# Patient Record
Sex: MALE | Race: WHITE | Employment: FULL TIME | URBAN - NONMETROPOLITAN AREA
[De-identification: names, ages, dates, MRNs, and addresses within clinical notes are randomized per-mention and may not be internally consistent; named-entity substitution may affect disease eponyms.]

---

## 2018-04-30 ENCOUNTER — TELEPHONE (OUTPATIENT)
Dept: CARDIOLOGY CLINIC | Age: 53
End: 2018-04-30

## 2018-05-09 RX ORDER — LANSOPRAZOLE 30 MG/1
30 CAPSULE, DELAYED RELEASE ORAL DAILY
COMMUNITY

## 2018-05-09 RX ORDER — HYDROCHLOROTHIAZIDE 25 MG/1
25 TABLET ORAL DAILY
COMMUNITY

## 2018-05-09 RX ORDER — LOSARTAN POTASSIUM 100 MG/1
100 TABLET ORAL DAILY
COMMUNITY

## 2018-05-11 ENCOUNTER — PREP FOR PROCEDURE (OUTPATIENT)
Dept: CARDIOLOGY | Age: 53
End: 2018-05-11

## 2018-05-11 RX ORDER — SODIUM CHLORIDE 0.9 % (FLUSH) 0.9 %
10 SYRINGE (ML) INJECTION PRN
Status: CANCELLED | OUTPATIENT
Start: 2018-05-11

## 2018-05-11 RX ORDER — DIPHENHYDRAMINE HYDROCHLORIDE 50 MG/ML
50 INJECTION INTRAMUSCULAR; INTRAVENOUS ONCE
Status: CANCELLED | OUTPATIENT
Start: 2018-05-11 | End: 2018-05-11

## 2018-05-11 RX ORDER — NITROGLYCERIN 0.4 MG/1
0.4 TABLET SUBLINGUAL EVERY 5 MIN PRN
Status: CANCELLED | OUTPATIENT
Start: 2018-05-11

## 2018-05-11 RX ORDER — SODIUM CHLORIDE 9 MG/ML
INJECTION, SOLUTION INTRAVENOUS CONTINUOUS
Status: CANCELLED | OUTPATIENT
Start: 2018-05-11

## 2018-05-11 RX ORDER — ASPIRIN 325 MG
325 TABLET ORAL ONCE
Status: CANCELLED | OUTPATIENT
Start: 2018-05-11 | End: 2018-05-11

## 2018-05-11 RX ORDER — SODIUM CHLORIDE 0.9 % (FLUSH) 0.9 %
10 SYRINGE (ML) INJECTION EVERY 12 HOURS SCHEDULED
Status: CANCELLED | OUTPATIENT
Start: 2018-05-11

## 2018-05-14 ENCOUNTER — APPOINTMENT (OUTPATIENT)
Dept: CARDIAC CATH/INVASIVE PROCEDURES | Age: 53
End: 2018-05-14
Attending: INTERNAL MEDICINE
Payer: COMMERCIAL

## 2018-05-14 ENCOUNTER — HOSPITAL ENCOUNTER (OUTPATIENT)
Dept: INPATIENT UNIT | Age: 53
Discharge: HOME | End: 2018-05-14
Attending: INTERNAL MEDICINE | Admitting: INTERNAL MEDICINE
Payer: COMMERCIAL

## 2018-05-14 VITALS
SYSTOLIC BLOOD PRESSURE: 120 MMHG | TEMPERATURE: 98.2 F | HEART RATE: 71 BPM | RESPIRATION RATE: 16 BRPM | DIASTOLIC BLOOD PRESSURE: 60 MMHG | HEIGHT: 72 IN | WEIGHT: 228 LBS | OXYGEN SATURATION: 99 % | BODY MASS INDEX: 30.88 KG/M2

## 2018-05-14 PROBLEM — Z98.890 S/P CARDIAC CATH: Status: ACTIVE | Noted: 2018-05-14

## 2018-05-14 LAB
ABO: NORMAL
ANION GAP SERPL CALCULATED.3IONS-SCNC: 15 MEQ/L (ref 8–16)
ANTIBODY SCREEN: NORMAL
APTT: 30.9 SECONDS (ref 22–38)
BUN BLDV-MCNC: 17 MG/DL (ref 7–22)
CALCIUM SERPL-MCNC: 9.3 MG/DL (ref 8.5–10.5)
CHLORIDE BLD-SCNC: 99 MEQ/L (ref 98–111)
CO2: 28 MEQ/L (ref 23–33)
CREAT SERPL-MCNC: 0.7 MG/DL (ref 0.4–1.2)
GFR SERPL CREATININE-BSD FRML MDRD: > 90 ML/MIN/1.73M2
GLUCOSE BLD-MCNC: 132 MG/DL (ref 70–108)
HCT VFR BLD CALC: 46.9 % (ref 42–52)
HEMOGLOBIN: 16.3 GM/DL (ref 14–18)
INR BLD: 0.92 (ref 0.85–1.13)
MCH RBC QN AUTO: 32 PG (ref 27–31)
MCHC RBC AUTO-ENTMCNC: 34.8 GM/DL (ref 33–37)
MCV RBC AUTO: 92 FL (ref 80–94)
PDW BLD-RTO: 12.7 % (ref 11.5–14.5)
PLATELET # BLD: 276 THOU/MM3 (ref 130–400)
PMV BLD AUTO: 8.5 FL (ref 7.4–10.4)
POTASSIUM REFLEX MAGNESIUM: 4.2 MEQ/L (ref 3.5–5.2)
RBC # BLD: 5.1 MILL/MM3 (ref 4.7–6.1)
RH FACTOR: NORMAL
SODIUM BLD-SCNC: 142 MEQ/L (ref 135–145)
WBC # BLD: 9.9 THOU/MM3 (ref 4.8–10.8)

## 2018-05-14 PROCEDURE — 85610 PROTHROMBIN TIME: CPT

## 2018-05-14 PROCEDURE — C1894 INTRO/SHEATH, NON-LASER: HCPCS

## 2018-05-14 PROCEDURE — 2780000010 HC IMPLANT OTHER

## 2018-05-14 PROCEDURE — 80048 BASIC METABOLIC PNL TOTAL CA: CPT

## 2018-05-14 PROCEDURE — 86901 BLOOD TYPING SEROLOGIC RH(D): CPT

## 2018-05-14 PROCEDURE — 93458 L HRT ARTERY/VENTRICLE ANGIO: CPT | Performed by: INTERNAL MEDICINE

## 2018-05-14 PROCEDURE — 85027 COMPLETE CBC AUTOMATED: CPT

## 2018-05-14 PROCEDURE — 99203 OFFICE O/P NEW LOW 30 MIN: CPT | Performed by: INTERNAL MEDICINE

## 2018-05-14 PROCEDURE — 2500000003 HC RX 250 WO HCPCS

## 2018-05-14 PROCEDURE — C1887 CATHETER, GUIDING: HCPCS

## 2018-05-14 PROCEDURE — 2580000003 HC RX 258: Performed by: NURSE PRACTITIONER

## 2018-05-14 PROCEDURE — 86900 BLOOD TYPING SEROLOGIC ABO: CPT

## 2018-05-14 PROCEDURE — 93571 IV DOP VEL&/PRESS C FLO 1ST: CPT | Performed by: INTERNAL MEDICINE

## 2018-05-14 PROCEDURE — C1769 GUIDE WIRE: HCPCS

## 2018-05-14 PROCEDURE — 93005 ELECTROCARDIOGRAM TRACING: CPT | Performed by: NURSE PRACTITIONER

## 2018-05-14 PROCEDURE — 86850 RBC ANTIBODY SCREEN: CPT

## 2018-05-14 PROCEDURE — 36415 COLL VENOUS BLD VENIPUNCTURE: CPT

## 2018-05-14 PROCEDURE — 6360000002 HC RX W HCPCS

## 2018-05-14 PROCEDURE — 85730 THROMBOPLASTIN TIME PARTIAL: CPT

## 2018-05-14 RX ORDER — ASPIRIN 325 MG
325 TABLET ORAL ONCE
Status: DISCONTINUED | OUTPATIENT
Start: 2018-05-14 | End: 2018-05-15 | Stop reason: HOSPADM

## 2018-05-14 RX ORDER — ATORVASTATIN CALCIUM 40 MG/1
40 TABLET, FILM COATED ORAL DAILY
COMMUNITY

## 2018-05-14 RX ORDER — SODIUM CHLORIDE 0.9 % (FLUSH) 0.9 %
10 SYRINGE (ML) INJECTION PRN
Status: DISCONTINUED | OUTPATIENT
Start: 2018-05-14 | End: 2018-05-15 | Stop reason: HOSPADM

## 2018-05-14 RX ORDER — SODIUM CHLORIDE 0.9 % (FLUSH) 0.9 %
10 SYRINGE (ML) INJECTION PRN
Status: DISCONTINUED | OUTPATIENT
Start: 2018-05-14 | End: 2018-05-14 | Stop reason: SDUPTHER

## 2018-05-14 RX ORDER — SODIUM CHLORIDE 0.9 % (FLUSH) 0.9 %
10 SYRINGE (ML) INJECTION EVERY 12 HOURS SCHEDULED
Status: DISCONTINUED | OUTPATIENT
Start: 2018-05-14 | End: 2018-05-14 | Stop reason: SDUPTHER

## 2018-05-14 RX ORDER — ONDANSETRON 2 MG/ML
4 INJECTION INTRAMUSCULAR; INTRAVENOUS EVERY 6 HOURS PRN
Status: DISCONTINUED | OUTPATIENT
Start: 2018-05-14 | End: 2018-05-15 | Stop reason: HOSPADM

## 2018-05-14 RX ORDER — NITROGLYCERIN 0.4 MG/1
TABLET SUBLINGUAL
Qty: 25 TABLET | Refills: 3 | Status: SHIPPED | OUTPATIENT
Start: 2018-05-14

## 2018-05-14 RX ORDER — ACETAMINOPHEN 325 MG/1
650 TABLET ORAL EVERY 4 HOURS PRN
Status: DISCONTINUED | OUTPATIENT
Start: 2018-05-14 | End: 2018-05-15 | Stop reason: HOSPADM

## 2018-05-14 RX ORDER — SODIUM CHLORIDE 9 MG/ML
INJECTION, SOLUTION INTRAVENOUS CONTINUOUS
Status: ACTIVE | OUTPATIENT
Start: 2018-05-14 | End: 2018-05-14

## 2018-05-14 RX ORDER — SODIUM CHLORIDE 9 MG/ML
INJECTION, SOLUTION INTRAVENOUS CONTINUOUS
Status: DISCONTINUED | OUTPATIENT
Start: 2018-05-14 | End: 2018-05-14 | Stop reason: SDUPTHER

## 2018-05-14 RX ORDER — NITROGLYCERIN 0.4 MG/1
0.4 TABLET SUBLINGUAL EVERY 5 MIN PRN
Status: DISCONTINUED | OUTPATIENT
Start: 2018-05-14 | End: 2018-05-15 | Stop reason: HOSPADM

## 2018-05-14 RX ORDER — DIPHENHYDRAMINE HYDROCHLORIDE 50 MG/ML
50 INJECTION INTRAMUSCULAR; INTRAVENOUS ONCE
Status: DISCONTINUED | OUTPATIENT
Start: 2018-05-14 | End: 2018-05-15 | Stop reason: HOSPADM

## 2018-05-14 RX ORDER — SODIUM CHLORIDE 0.9 % (FLUSH) 0.9 %
10 SYRINGE (ML) INJECTION EVERY 12 HOURS SCHEDULED
Status: DISCONTINUED | OUTPATIENT
Start: 2018-05-14 | End: 2018-05-15 | Stop reason: HOSPADM

## 2018-05-14 RX ADMIN — SODIUM CHLORIDE: 9 INJECTION, SOLUTION INTRAVENOUS at 12:17

## 2018-05-15 LAB
EKG ATRIAL RATE: 63 BPM
EKG P AXIS: 49 DEGREES
EKG P-R INTERVAL: 162 MS
EKG Q-T INTERVAL: 414 MS
EKG QRS DURATION: 92 MS
EKG QTC CALCULATION (BAZETT): 423 MS
EKG R AXIS: 21 DEGREES
EKG T AXIS: 7 DEGREES
EKG VENTRICULAR RATE: 63 BPM

## 2020-06-25 ENCOUNTER — NURSE ONLY (OUTPATIENT)
Dept: LAB | Age: 55
End: 2020-06-25

## 2023-05-23 ENCOUNTER — TELEPHONE (OUTPATIENT)
Dept: CARDIOLOGY CLINIC | Age: 58
End: 2023-05-23

## 2023-05-23 PROBLEM — E66.9 OBESITY (BMI 30.0-34.9): Status: ACTIVE | Noted: 2018-09-14

## 2023-05-23 PROBLEM — H61.23 BILATERAL IMPACTED CERUMEN: Status: ACTIVE | Noted: 2023-05-23

## 2023-05-23 RX ORDER — ROSUVASTATIN CALCIUM 40 MG/1
40 TABLET, COATED ORAL EVERY EVENING
COMMUNITY

## 2023-05-23 RX ORDER — ESOMEPRAZOLE MAGNESIUM 20 MG/1
20 FOR SUSPENSION ORAL DAILY
COMMUNITY

## 2023-05-23 RX ORDER — DILTIAZEM HYDROCHLORIDE 60 MG/1
60 TABLET, FILM COATED ORAL AS NEEDED
COMMUNITY

## 2023-05-23 NOTE — TELEPHONE ENCOUNTER
Spoke with patient, confirmed appt date and arrival time, all instructions for date of cath. Patient verbalized understanding of all instructions. Instructions mailed this date also.

## 2023-05-23 NOTE — TELEPHONE ENCOUNTER
Request for left heart cath received from Dr. Arianna Marquez office this date. Left heart cath scheduled for 6/14/23 at 10:00 am, arrival time of 8:00 am. Patient to hold Hctz, Jardiance and Metformin on day of procedure, take all other meds as usual.     Left message for patient to return call to confirm date and time. Instructions will be mailed upon date confirmation. Case scheduled in cath lab with Sara

## 2023-06-13 ENCOUNTER — PREP FOR PROCEDURE (OUTPATIENT)
Dept: CARDIOLOGY | Age: 58
End: 2023-06-13

## 2023-06-13 RX ORDER — SODIUM CHLORIDE 0.9 % (FLUSH) 0.9 %
5-40 SYRINGE (ML) INJECTION PRN
Status: CANCELLED | OUTPATIENT
Start: 2023-06-13

## 2023-06-13 RX ORDER — SODIUM CHLORIDE 9 MG/ML
INJECTION, SOLUTION INTRAVENOUS CONTINUOUS
Status: CANCELLED | OUTPATIENT
Start: 2023-06-13

## 2023-06-13 RX ORDER — NITROGLYCERIN 0.4 MG/1
0.4 TABLET SUBLINGUAL EVERY 5 MIN PRN
Status: CANCELLED | OUTPATIENT
Start: 2023-06-13

## 2023-06-13 RX ORDER — SODIUM CHLORIDE 9 MG/ML
INJECTION, SOLUTION INTRAVENOUS PRN
Status: CANCELLED | OUTPATIENT
Start: 2023-06-13

## 2023-06-13 RX ORDER — ASPIRIN 325 MG
325 TABLET ORAL ONCE
Status: CANCELLED | OUTPATIENT
Start: 2023-06-13 | End: 2023-06-13

## 2023-06-13 RX ORDER — SODIUM CHLORIDE 0.9 % (FLUSH) 0.9 %
5-40 SYRINGE (ML) INJECTION EVERY 12 HOURS SCHEDULED
Status: CANCELLED | OUTPATIENT
Start: 2023-06-13

## 2023-06-14 ENCOUNTER — HOSPITAL ENCOUNTER (OUTPATIENT)
Dept: INPATIENT UNIT | Age: 58
Discharge: HOME | End: 2023-06-14
Attending: INTERNAL MEDICINE | Admitting: INTERNAL MEDICINE
Payer: COMMERCIAL

## 2023-06-14 VITALS
WEIGHT: 217.4 LBS | RESPIRATION RATE: 13 BRPM | HEART RATE: 82 BPM | OXYGEN SATURATION: 92 % | HEIGHT: 72 IN | TEMPERATURE: 98.3 F | BODY MASS INDEX: 29.45 KG/M2 | DIASTOLIC BLOOD PRESSURE: 69 MMHG | SYSTOLIC BLOOD PRESSURE: 127 MMHG

## 2023-06-14 PROBLEM — I20.9 ANGINA, CLASS III (HCC): Status: ACTIVE | Noted: 2023-06-14

## 2023-06-14 LAB
ABO: NORMAL
ANION GAP SERPL CALC-SCNC: 12 MEQ/L (ref 8–16)
ANTIBODY SCREEN: NORMAL
APTT PPP: 30.5 SECONDS (ref 22–38)
BUN SERPL-MCNC: 12 MG/DL (ref 7–22)
CALCIUM SERPL-MCNC: 9.3 MG/DL (ref 8.5–10.5)
CHLORIDE SERPL-SCNC: 103 MEQ/L (ref 98–111)
CHOLEST SERPL-MCNC: 150 MG/DL (ref 100–199)
CO2 SERPL-SCNC: 25 MEQ/L (ref 23–33)
CREAT SERPL-MCNC: 0.6 MG/DL (ref 0.4–1.2)
DEPRECATED RDW RBC AUTO: 41.6 FL (ref 35–45)
ERYTHROCYTE [DISTWIDTH] IN BLOOD BY AUTOMATED COUNT: 12.2 % (ref 11.5–14.5)
GFR SERPL CREATININE-BSD FRML MDRD: > 60 ML/MIN/1.73M2
GLUCOSE SERPL-MCNC: 157 MG/DL (ref 70–108)
HCT VFR BLD AUTO: 45.5 % (ref 42–52)
HDLC SERPL-MCNC: 54 MG/DL
HGB BLD-MCNC: 15.4 GM/DL (ref 14–18)
INR PPP: 0.88 (ref 0.85–1.13)
LDLC SERPL CALC-MCNC: 69 MG/DL
MCH RBC QN AUTO: 31.4 PG (ref 26–33)
MCHC RBC AUTO-ENTMCNC: 33.8 GM/DL (ref 32.2–35.5)
MCV RBC AUTO: 92.9 FL (ref 80–94)
PLATELET # BLD AUTO: 241 THOU/MM3 (ref 130–400)
PMV BLD AUTO: 10.3 FL (ref 9.4–12.4)
POTASSIUM SERPL-SCNC: 4.3 MEQ/L (ref 3.5–5.2)
RBC # BLD AUTO: 4.9 MILL/MM3 (ref 4.7–6.1)
RH FACTOR: NORMAL
SODIUM SERPL-SCNC: 140 MEQ/L (ref 135–145)
TRIGL SERPL-MCNC: 134 MG/DL (ref 0–199)
WBC # BLD AUTO: 7.7 THOU/MM3 (ref 4.8–10.8)

## 2023-06-14 PROCEDURE — 86850 RBC ANTIBODY SCREEN: CPT

## 2023-06-14 PROCEDURE — 93010 ELECTROCARDIOGRAM REPORT: CPT | Performed by: NUCLEAR MEDICINE

## 2023-06-14 PROCEDURE — 86901 BLOOD TYPING SEROLOGIC RH(D): CPT

## 2023-06-14 PROCEDURE — 80048 BASIC METABOLIC PNL TOTAL CA: CPT

## 2023-06-14 PROCEDURE — 85027 COMPLETE CBC AUTOMATED: CPT

## 2023-06-14 PROCEDURE — C1769 GUIDE WIRE: HCPCS

## 2023-06-14 PROCEDURE — 86900 BLOOD TYPING SEROLOGIC ABO: CPT

## 2023-06-14 PROCEDURE — 93005 ELECTROCARDIOGRAM TRACING: CPT | Performed by: PHYSICIAN ASSISTANT

## 2023-06-14 PROCEDURE — 85610 PROTHROMBIN TIME: CPT

## 2023-06-14 PROCEDURE — 6360000004 HC RX CONTRAST MEDICATION: Performed by: INTERNAL MEDICINE

## 2023-06-14 PROCEDURE — 6360000002 HC RX W HCPCS

## 2023-06-14 PROCEDURE — 2500000003 HC RX 250 WO HCPCS

## 2023-06-14 PROCEDURE — 85730 THROMBOPLASTIN TIME PARTIAL: CPT

## 2023-06-14 PROCEDURE — 93458 L HRT ARTERY/VENTRICLE ANGIO: CPT | Performed by: INTERNAL MEDICINE

## 2023-06-14 PROCEDURE — 93458 L HRT ARTERY/VENTRICLE ANGIO: CPT

## 2023-06-14 PROCEDURE — 80061 LIPID PANEL: CPT

## 2023-06-14 PROCEDURE — 36415 COLL VENOUS BLD VENIPUNCTURE: CPT

## 2023-06-14 PROCEDURE — 2580000003 HC RX 258: Performed by: PHYSICIAN ASSISTANT

## 2023-06-14 PROCEDURE — C1894 INTRO/SHEATH, NON-LASER: HCPCS

## 2023-06-14 RX ORDER — SODIUM CHLORIDE 9 MG/ML
INJECTION, SOLUTION INTRAVENOUS CONTINUOUS
Status: DISCONTINUED | OUTPATIENT
Start: 2023-06-14 | End: 2023-06-14 | Stop reason: HOSPADM

## 2023-06-14 RX ORDER — SODIUM CHLORIDE 9 MG/ML
INJECTION, SOLUTION INTRAVENOUS PRN
Status: DISCONTINUED | OUTPATIENT
Start: 2023-06-14 | End: 2023-06-14 | Stop reason: HOSPADM

## 2023-06-14 RX ORDER — ASPIRIN 81 MG/1
81 TABLET ORAL DAILY
COMMUNITY

## 2023-06-14 RX ORDER — SODIUM CHLORIDE 0.9 % (FLUSH) 0.9 %
5-40 SYRINGE (ML) INJECTION PRN
Status: DISCONTINUED | OUTPATIENT
Start: 2023-06-14 | End: 2023-06-14 | Stop reason: SDUPTHER

## 2023-06-14 RX ORDER — ACETAMINOPHEN 325 MG/1
650 TABLET ORAL EVERY 4 HOURS PRN
Status: DISCONTINUED | OUTPATIENT
Start: 2023-06-14 | End: 2023-06-14 | Stop reason: HOSPADM

## 2023-06-14 RX ORDER — ASPIRIN 325 MG
325 TABLET ORAL ONCE
Status: DISCONTINUED | OUTPATIENT
Start: 2023-06-14 | End: 2023-06-14 | Stop reason: HOSPADM

## 2023-06-14 RX ORDER — SODIUM CHLORIDE 0.9 % (FLUSH) 0.9 %
5-40 SYRINGE (ML) INJECTION PRN
Status: DISCONTINUED | OUTPATIENT
Start: 2023-06-14 | End: 2023-06-14 | Stop reason: HOSPADM

## 2023-06-14 RX ORDER — SODIUM CHLORIDE 0.9 % (FLUSH) 0.9 %
5-40 SYRINGE (ML) INJECTION EVERY 12 HOURS SCHEDULED
Status: DISCONTINUED | OUTPATIENT
Start: 2023-06-14 | End: 2023-06-14 | Stop reason: HOSPADM

## 2023-06-14 RX ORDER — SODIUM CHLORIDE 0.9 % (FLUSH) 0.9 %
5-40 SYRINGE (ML) INJECTION EVERY 12 HOURS SCHEDULED
Status: DISCONTINUED | OUTPATIENT
Start: 2023-06-14 | End: 2023-06-14 | Stop reason: SDUPTHER

## 2023-06-14 RX ORDER — NITROGLYCERIN 0.4 MG/1
0.4 TABLET SUBLINGUAL EVERY 5 MIN PRN
Status: DISCONTINUED | OUTPATIENT
Start: 2023-06-14 | End: 2023-06-14 | Stop reason: HOSPADM

## 2023-06-14 RX ADMIN — SODIUM CHLORIDE: 9 INJECTION, SOLUTION INTRAVENOUS at 08:30

## 2023-06-14 RX ADMIN — IOPAMIDOL 75 ML: 755 INJECTION, SOLUTION INTRAVENOUS at 10:42

## 2023-06-14 NOTE — PLAN OF CARE
Problem: Chronic Conditions and Co-morbidities  Goal: Patient's chronic conditions and co-morbidity symptoms are monitored and maintained or improved  Outcome: Progressing  Flowsheets (Taken 6/14/2023 0804)  Care Plan - Patient's Chronic Conditions and Co-Morbidity Symptoms are Monitored and Maintained or Improved: Monitor and assess patient's chronic conditions and comorbid symptoms for stability, deterioration, or improvement     Problem: Discharge Planning  Goal: Discharge to home or other facility with appropriate resources  Outcome: Progressing  Flowsheets (Taken 6/14/2023 0835)  Discharge to home or other facility with appropriate resources:   Identify barriers to discharge with patient and caregiver   Identify discharge learning needs (meds, wound care, etc)   Refer to discharge planning if patient needs post-hospital services based on physician order or complex needs related to functional status, cognitive ability or social support system   Discharged home

## 2023-06-14 NOTE — PLAN OF CARE
Problem: Chronic Conditions and Co-morbidities  Goal: Patient's chronic conditions and co-morbidity symptoms are monitored and maintained or improved  Outcome: Progressing  Flowsheets (Taken 6/14/2023 0804)  Care Plan - Patient's Chronic Conditions and Co-Morbidity Symptoms are Monitored and Maintained or Improved: Monitor and assess patient's chronic conditions and comorbid symptoms for stability, deterioration, or improvement     Problem: Discharge Planning  Goal: Discharge to home or other facility with appropriate resources  Outcome: Progressing  Flowsheets (Taken 6/14/2023 0835)  Discharge to home or other facility with appropriate resources:   Identify barriers to discharge with patient and caregiver   Identify discharge learning needs (meds, wound care, etc)   Refer to discharge planning if patient needs post-hospital services based on physician order or complex needs related to functional status, cognitive ability or social support system   Care plan reviewed with patient and wife. Patient and wife verbalize understanding of the plan of care and contribute to goal setting.

## 2023-06-14 NOTE — PROCEDURES
[address] [id number]                            CARDIAC CATHETERIZATION    PATIENT NAME: Mathieu Ruggiero                       :        1965  MED REC NO:   [id number]                           ROOM:       0004  ACCOUNT NO:   [de-identified]                           ADMIT DATE: 2023  PROVIDER:     Ace Elizondo MD    DATE OF PROCEDURE:  2023    CARDIAC CATHETERIZATION REPORT    PROCEDURES PERFORMED:  Coronary angiogram, LV gram.    INDICATION FOR STUDY:  Angina equivalent symptoms. DESCRIPTION OF PROCEDURE:  After written informed consent was obtained,  the patient was brought to the cardiac catheterization laboratory in a  fasting nonsedated state. Preprocedure time-out was performed. 2%  lidocaine was used to anesthetize the subcutaneous tissue overlying the  right femoral artery. Using modified Seldinger technique under  ultrasound and fluoroscopy guidance, we were able to place a 5-[place]izen [place]  sheath in the right common femoral artery. After the sheath was  inserted, femoral angiogram was performed which showed good common  femoral artery stick. ESTIMATED BLOOD LOSS:  During this procedure was less than 20 mL. EQUIPMENTS USED:  Standard 5-[place]izen [place] 3DRC, pigtail, JL-4, EBU 3.5  catheters. MEDICATIONS:  Please see EMR. CORONARY ANATOMY:  1. Right coronary artery is a dominant vessel. It is patent in the  proximal portion. Mid and distal portions have mild luminal  irregularities. 2.  Left main is overall patent, gives rise to LAD and left circumflex  artery. 3.  Left circumflex artery is overall patent with mild luminal  irregularities in the proximal and distal portions of the vessel. 4. LAD is patent in the proximal portion, mid and distal portion with  mild luminal irregularities. 5.  LVEDP was measured to be 14 mmHg. There was no significant gradient  across the aortic valve.   LV systolic

## 2023-06-14 NOTE — DISCHARGE INSTRUCTIONS
Post Procedure Discharge Instructions    Home Care    * Keep procedure site clean and dry. * Shower and soak dressing off 24 hours post procedure. * Wash site gently with soap and water, pat the area dry and cover with a bandaid. * Wash site and change bandaid daily for the next 5 days when you wash up or shower. * Monitor site for signs of bleeding, swelling or infection. * Do not use any powders, lotions or creams in the area of the procedure until the site is healed. * Do not soak in any pools of water until site is healed, such as a bathtub, hot tub or swimming pool (generally 5-7 days). The access site may appear bruised. It is normal to have some discoloration at the site. If the discoloration is enlarging or is larger than the palm of your hand, call the Physicians office    It is normal to have some tenderness or soreness at the site of the procedure. This may be relieved with the use of tylenol. Physical Activity     * Limit activity times 3 days. * No driving times 3 days after a cardiac cath and no driving for 1 week after a PTCA/stent procedure  * No lifting greater than 5 pounds. * Avoid excessive bending and squatting. Call 911 if  You are bleeding from the area where the catheter was put in your artery. Immediately hold firm pressure on the site, lie down and call 911 to transport you to nearest emergency room. You have a fast-growing, painful lump at the catheter site. Immediately hold firm pressure on site, lie down and call 911 to transport you to nearest emergency room. If you notice numbness, tingling, coldness or loss of feeling in the extremity on the side of the procedure, CALL THE PHYSICIAN IMMEDIATELY or 911  Notify office if you have signs of infection, such as: Increased pain, swelling, warmth, or redness. Red streaks leading from the catheter site. Pus draining from the catheter site.   If you develop a fever within 2-3 days after your procedure

## 2023-06-14 NOTE — PROGRESS NOTES
0745  Pt admitted to  2E04 ambulatory for cardiac cath. Pt NPO. Patient accompanied by wife. Vital signs obtained. Assessment and data collection initiated. Oriented to room. Policies and procedures for 2E explained   All questions answered with no further questions at this time. Fall prevention and safety precautions discussed with patient. 1000  Pt to cath lab per bed  1058  Pt ret'd to 2E04 post cardiac cath. Right femerol site soft, occlusive drsg D/I. Pt denies any pain. Pt instructed to refrain from lifting his head or moving his right leg. Wife at bedside.  IV 0.9NS cont'd   1100  HOB elevated 30 degrees. Pt taking diet/fluids - viridiana well  1300  Pt assisted to bathroom to vd. Amb in wheeler - viridiana well. Ret'd to room, resting in bed. Procedure site remains soft, drsg D/I. Pt denies any pain, dizziness or lightheadedness  1325  Cath report, labs and discharge instructions faxed to Dr GAINES of [place]  office  [phone]  IV site discont'd. Telemetry discont'd  Discharge instructions reviewed with pt and wife, they voice understanding r/t f/u appointment, how to care for procedure site and activity restrictions. 1359  Pt discharged per w/c for transport home with wife via central transport.

## 2023-06-14 NOTE — OP NOTE
[address] [id number]  Sedation/Analgesia Post Sedation Record        Pt Name: Nadine Kebede  MRN: [id number]  YOB: 1965  Procedure Performed By: Daniella Partida MD MD, [facility] [place], [address]  Primary Care Physician: VANESSA Cortez - CNP    POST-PROCEDURE    Sedation/Anesthesia:  Local Anesthesia and IV Conscious Sedation with continuous O2 monitoring    Estimated Blood Loss: 10 cc     Specimens Removed:  [x]None []Other:      Disposition of Specimen:  []Pathology []Other        Complications:   [x]None Immediate []Other:       Procedure performed: Left heart cath    Post Procedure Diagnosis/Findings:  Non-obstructive Coronary Artery Disease        Recommendations:    Medical treatment  Routine post-cath care.                Daniella Partida MD MD, [facility], RPVI  Electronically signed 6/14/2023 at 12:30 PM

## 2023-06-15 LAB
EKG ATRIAL RATE: 54 BPM
EKG P AXIS: 49 DEGREES
EKG P-R INTERVAL: 166 MS
EKG Q-T INTERVAL: 418 MS
EKG QRS DURATION: 94 MS
EKG QTC CALCULATION (BAZETT): 396 MS
EKG R AXIS: 14 DEGREES
EKG T AXIS: 7 DEGREES
EKG VENTRICULAR RATE: 54 BPM